# Patient Record
Sex: MALE | Race: OTHER | HISPANIC OR LATINO | URBAN - METROPOLITAN AREA
[De-identification: names, ages, dates, MRNs, and addresses within clinical notes are randomized per-mention and may not be internally consistent; named-entity substitution may affect disease eponyms.]

---

## 2024-10-15 ENCOUNTER — TELEPHONE (OUTPATIENT)
Dept: URGENT CARE | Facility: CLINIC | Age: 74
End: 2024-10-15

## 2024-10-17 ENCOUNTER — TELEPHONE (OUTPATIENT)
Dept: NEUROLOGY | Facility: CLINIC | Age: 74
End: 2024-10-17

## 2024-10-17 NOTE — TELEPHONE ENCOUNTER
----- Message from Nurse Renny sent at 10/17/2024 10:41 AM CDT -----  Contact: [facility] [phone]  I will need the WC information.  I've attempted twice to obtain this information.    Orestes  ----- Message -----  From: Gisel Ayala MA  Sent: 10/16/2024   1:30 PM CDT  To: Renny Zaragoza LPN    Good afternoon,     Message from scheduling department - Pt worker comp would like a call the son doesn't speak good English. She's the one that has control of everything. I try to send this to Orestes but it's not letting me. [facility] [phone]     Thank you,   Gisel  ----- Message -----  From: Gisel Ayala MA  Sent: 10/15/2024  10:55 AM CDT  To: Renny Zaragoza LPN; Ida Sawyer MA; #    Good morning Orestes,     Can you help with seeing if we can approval for this Pt to be seen by Dr. Parker?    Thank you,   Gisel  ----- Message -----  From: Ida Sawyer MA  Sent: 10/15/2024  10:44 AM CDT  To: Gisel Ayala MA; Laurel Pérez,    We/The residents do NOT see pts with workers comp. This pt was scheduled incorrectly. I'm adding the correct Staff/MA.    -Eliseo Batres,     Can you r/s this pt's appt with Dr. Parker/Dr. Ocasio if she sees workers comp for their next soonest when you have a chance?    Thanks,  YUN Prieto  ----- Message -----  From: Laurel Parekh  Sent: 10/15/2024  10:36 AM CDT  To: Akhil Alvarez Staff    [email or web address]     Patient is calling for Medical Advice regarding:head injury     Patient wants a call back or [facility]:Call back     Comments:Pt workers comp would like a call back due to New Pt she has questions for nurse     Please advise patient replies from provider may take up to 48 hours.

## 2024-10-21 ENCOUNTER — TELEPHONE (OUTPATIENT)
Dept: NEUROLOGY | Facility: CLINIC | Age: 74
End: 2024-10-21
Payer: OTHER MISCELLANEOUS
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
## 2024-11-05 ENCOUNTER — TELEPHONE (OUTPATIENT)
Dept: NEUROLOGY | Facility: CLINIC | Age: 74
End: 2024-11-05
Payer: OTHER MISCELLANEOUS

## 2024-11-05 ENCOUNTER — OFFICE VISIT (OUTPATIENT)
Dept: URGENT CARE | Facility: CLINIC | Age: 74
End: 2024-11-05
Payer: OTHER MISCELLANEOUS

## 2024-11-05 VITALS
RESPIRATION RATE: 20 BRPM | HEIGHT: 62 IN | SYSTOLIC BLOOD PRESSURE: 178 MMHG | DIASTOLIC BLOOD PRESSURE: 78 MMHG | HEART RATE: 68 BPM | TEMPERATURE: 98 F | OXYGEN SATURATION: 97 % | BODY MASS INDEX: 30 KG/M2 | WEIGHT: 163 LBS

## 2024-11-05 DIAGNOSIS — G44.311 ACUTE POST-TRAUMATIC HEADACHE, INTRACTABLE: ICD-10-CM

## 2024-11-05 DIAGNOSIS — Z02.6 ENCOUNTER RELATED TO WORKER'S COMPENSATION CLAIM: ICD-10-CM

## 2024-11-05 DIAGNOSIS — R56.1 POST TRAUMATIC SEIZURE DISORDER: ICD-10-CM

## 2024-11-05 DIAGNOSIS — S06.9X0A TRAUMATIC BRAIN INJURY, WITHOUT LOSS OF CONSCIOUSNESS, INITIAL ENCOUNTER: Primary | ICD-10-CM

## 2024-11-05 RX ORDER — TRAZODONE HYDROCHLORIDE 50 MG/1
50 TABLET ORAL NIGHTLY
COMMUNITY
Start: 2024-10-09

## 2024-11-05 RX ORDER — LIDOCAINE 50 MG/G
1 PATCH TOPICAL DAILY PRN
COMMUNITY
Start: 2024-05-21

## 2024-11-05 RX ORDER — DULOXETIN HYDROCHLORIDE 20 MG/1
20 CAPSULE, DELAYED RELEASE ORAL NIGHTLY
COMMUNITY
Start: 2024-10-05

## 2024-11-05 RX ORDER — DICLOFENAC SODIUM 75 MG/1
75 TABLET, DELAYED RELEASE ORAL 2 TIMES DAILY
COMMUNITY
Start: 2024-07-15

## 2024-11-05 RX ORDER — LEVETIRACETAM 750 MG/1
1500 TABLET ORAL 2 TIMES DAILY
COMMUNITY
Start: 2024-07-15

## 2024-11-05 NOTE — TELEPHONE ENCOUNTER
Spoke to Pt's adjustor, Elisha. I informed her that the Pt's appt has been canceled. Elisha was very upset. She stated the Pt drove 3 hours away and is staying at the [facility]. Elisha also arranged a . Elisha was informed that we never received Pt's medical records and that Orestes has been trying to get in touch with her. Elisha stated she sent over the records and confirmed with someone else. Dr. Parker advises that the Pt go to [facility] for evaluation. I called Elisha back to inform her of what Dr. Parker advised but I was unable to speak with her. LVM

## 2024-11-05 NOTE — TELEPHONE ENCOUNTER
Called Pt with the help of a . I was calling the Pt to inform him, his appt is canceled today. I was unable to speak with him but the  left a vm.     Dr. Parker - Please advise that appointment will need to be rescheduled as workers comp information was never provided, we were never told if we have permission to treat or if this is an evaluation only which I do not do, and despite Orestes requesting medical records from the adjustor multiple times, none were provided to see if patient appropriate for clinic.
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
## 2024-11-05 NOTE — TELEPHONE ENCOUNTER
Pt arrived to the clinic today with his son, adjustor, and . I informed them the appt was canceled since was scheduled incorrectly and we were unable to get medical records for the Pt until late this morning. I informed them that Dr. Parker would not the correct provider since he does not specialize in severe TBI but did recommend another provider but Dr. Meléndez does not take workers comp patients to our knowledge. The adjustor was very upset and called Orestes. The adjustor had me speak with him. I informed him about the above. He asked the Pt come to [place] to get his blood draw for his Keppra level.

## 2025-01-14 ENCOUNTER — TELEPHONE (OUTPATIENT)
Dept: PHYSICAL MEDICINE AND REHAB | Facility: CLINIC | Age: 75
End: 2025-01-14
Payer: OTHER MISCELLANEOUS

## 2025-01-14 NOTE — TELEPHONE ENCOUNTER
----- Message from Med Assistant Nadja sent at 1/14/2025  3:51 PM CST -----    ----- Message -----  From: Nadja Harris MA  Sent: 1/14/2025  11:43 AM CST  To: Renny Zaragoza LPN    After checking further I spoke to w/[facility] and was told that Dr Maik Parker in neurology see w/comp patients and the original referral was for neurology.  The patient was seen by Dr Zaragoza who referred the patient to neurology. I was told NOT to schedule the patient without a referral for PM&R from the referring MD (Dr Zaragoza).  ----- Message -----  From: Renny Zaragoza LPN  Sent: 1/10/2025  11:06 AM CST  To: Consuelo Underwood RN; Nadja Harris MA    All we need is to get the Pt scheduled.  Everything is approved.    Orestes  ----- Message -----  From: Consuelo Underwood RN  Sent: 1/8/2025   4:05 PM CST  To: Nadja Harris MA; Renny Zaragoza LPN    Orestes-  Where'd we leave this? Does pt still need an appt?  Nadja-   In this instance, WC is the insurance. If the adjustor approves, they'll pay.  No referral is needed rather Orestes and the pre-service team's okay to move forward if the MD accepts.  ----- Message -----  From: Nadja Harris MA  Sent: 1/7/2025   2:05 PM CST  To: Consuelo Underwood RN    Before I can even attempt to schedule the patient for Dr Perez I will need a referral from w/[facility] for physical med and rehab to make sure it will be covered.  ----- Message -----  From: Consuelo Underwood RN  Sent: 12/20/2024  11:57 AM CST  To: Renny Zaragoza LPN; Cornelius Perez MD; #    Thank you, Dr. Perez. If you are willing to see this pt, let's have your team call to determine if pt still needs care for his TBI and would like to come for a visit.  ----- Message -----  From: Cornelius Perez MD  Sent: 12/17/2024   7:08 PM CST  To: Consuelo Underwood RN; ROSY Cabrales,    Just saw this. I do see some workers comp  ----- Message -----  From: Consuelo Underwood RN  Sent: 11/21/2024  10:10 AM CST  To: Renny  ROSY Zaragoza; Cornelius Perez MD    Good morning, Dr. Perez-     Do you see Workers Comp?  Consuelo Haines  ----- Message -----  From: Renny Zaragoza LPN  Sent: 11/21/2024   9:48 AM CST  To: Consuelo Underwood RN; Argenis Neville, RN; #    Would that change if the client agreed to a pre-payment?   The patient has a nurse  following his care, and just needs an evaluation, as the seizures are unwitnessed.      Any assistance would be appreciated.    Orestes  ----- Message -----  From: Argenis Neville, GRETEL  Sent: 11/21/2024   8:52 AM CST  To: Consuelo Underwood RN; Renny Zaragoza LPN; #    None of our [facility] epileptologists see WC patients  ----- Message -----  From: Consuelo Underwood RN  Sent: 11/20/2024   2:08 PM CST  To: Renny Zaragoza LPN; Argenis Neville, RN; #    Hey, do y'all know if any one of our epileptologists will see WC? Do I need to ask directly?     Orestes- I see he is approved. Will keep you posted.    Consuelo Haines  ----- Message -----  From: Maylin Zeng MA  Sent: 11/20/2024   1:50 PM CST  To: Consuelo Underwood RN; Renny Zaragoza LPN    Good afternoon    Eliseo Cordero,    Can you please assist me with getting this pt scheduled?  ----- Message -----  From: Renny Zaragoza LPN  Sent: 11/20/2024  11:19 AM CST  To: Maylin Zeng MA    Are you aware of another provider that will see him for this issue at [facility]?    Orestes  ----- Message -----  From: Maylin Zeng MA  Sent: 11/18/2024   9:35 AM CST  To: Renny Zaragoza LPN    Good morning     Will he pay out of pocket for the evaluation if not. He will not be about to be seen here, He will have to go to [facility].    Maylin  ----- Message -----  From: Renny Zaragoza LPN  Sent: 11/14/2024  11:18 AM CST  To: Maylin Zeng MA    It is.   We just need an evaluation.  He's stating he's having seizures, unwitnessed, and complains of headaches.     Orestes  ----- Message -----  From: Maylin Zeng MA  Sent: 11/14/2024  11:12 AM CST  To:  Renny Zaragoza LPN    Good morning     It this a  pt?     Maylin  Clinical Supervisor  ----- Message -----  From: Renny Zaragoza LPN  Sent: 11/14/2024  11:08 AM CST  To: Guerline Meléndez MD; Medhat Cunha Staff    This patient is approved, and would like to be followed up due to possible seizure activity and headaches.   I have records scanned in [facility]    Please advise.    Thank You   Orestes

## 2025-01-14 NOTE — TELEPHONE ENCOUNTER
This patient does not have a referral for PM&R from wj/comp. The referral is for neurology. If not approved by w/comp the patient will not be seen per Dr Perez.